# Patient Record
Sex: OTHER/UNKNOWN | URBAN - METROPOLITAN AREA
[De-identification: names, ages, dates, MRNs, and addresses within clinical notes are randomized per-mention and may not be internally consistent; named-entity substitution may affect disease eponyms.]

---

## 2021-11-08 ENCOUNTER — APPOINTMENT (OUTPATIENT)
Age: 66
Setting detail: DERMATOLOGY
End: 2021-11-08

## 2021-11-08 VITALS
HEIGHT: 69 IN | HEART RATE: 67 BPM | SYSTOLIC BLOOD PRESSURE: 142 MMHG | WEIGHT: 165 LBS | DIASTOLIC BLOOD PRESSURE: 79 MMHG

## 2021-11-08 DIAGNOSIS — L30.0 NUMMULAR DERMATITIS: ICD-10-CM

## 2021-11-08 PROCEDURE — OTHER MIPS QUALITY: OTHER

## 2021-11-08 PROCEDURE — OTHER PRESCRIPTION: OTHER

## 2021-11-08 PROCEDURE — OTHER COUNSELING: OTHER

## 2021-11-08 PROCEDURE — 99203 OFFICE O/P NEW LOW 30 MIN: CPT

## 2021-11-08 RX ORDER — CLOBETASOL PROPIONATE 0.5 MG/G
.05% CREAM TOPICAL BID
Qty: 45 | Refills: 1 | Status: ERX | COMMUNITY
Start: 2021-11-08

## 2021-11-08 NOTE — PROCEDURE: MIPS QUALITY
Name And Contact Information For Health Care Proxy: Dolly [phone] Name And Contact Information For Health Care Proxy: Dolly [phone]

## 2021-11-17 ENCOUNTER — RX ONLY (RX ONLY)
Age: 66
End: 2021-11-17

## 2021-11-17 RX ORDER — CLOBETASOL PROPIONATE 0.5 MG/G
.05% CREAM TOPICAL BID
Qty: 60 | Refills: 0 | Status: ERX

## 2021-11-29 ENCOUNTER — APPOINTMENT (OUTPATIENT)
Age: 66
Setting detail: DERMATOLOGY
End: 2021-11-29

## 2021-11-29 VITALS
HEIGHT: 70 IN | SYSTOLIC BLOOD PRESSURE: 170 MMHG | HEART RATE: 71 BPM | DIASTOLIC BLOOD PRESSURE: 92 MMHG | WEIGHT: 155 LBS

## 2021-11-29 DIAGNOSIS — R03.0 ELEVATED BLOOD-PRESSURE READING, WITHOUT DIAGNOSIS OF HYPERTENSION: ICD-10-CM

## 2021-11-29 DIAGNOSIS — L81.0 POSTINFLAMMATORY HYPERPIGMENTATION: ICD-10-CM

## 2021-11-29 DIAGNOSIS — L30.0 NUMMULAR DERMATITIS: ICD-10-CM

## 2021-11-29 PROCEDURE — OTHER TREATMENT REGIMEN: OTHER

## 2021-11-29 PROCEDURE — 99213 OFFICE O/P EST LOW 20 MIN: CPT

## 2021-11-29 PROCEDURE — OTHER MIPS QUALITY: OTHER

## 2021-11-29 PROCEDURE — OTHER COUNSELING: OTHER

## 2021-11-29 ASSESSMENT — LOCATION ZONE DERM: LOCATION ZONE: TRUNK

## 2021-11-29 ASSESSMENT — LOCATION SIMPLE DESCRIPTION DERM: LOCATION SIMPLE: RIGHT UPPER BACK

## 2021-11-29 ASSESSMENT — LOCATION DETAILED DESCRIPTION DERM: LOCATION DETAILED: RIGHT MID-UPPER BACK

## 2021-11-29 NOTE — PROCEDURE: MIPS QUALITY
Name And Contact Information For Health Care Proxy: Dolly [phone] Name And Contact Information For Health Care Proxy: Dolly [phone]

## 2021-11-29 NOTE — PROCEDURE: TREATMENT REGIMEN
Detail Level: Zone
Discontinue Regimen: clobetasol 0.05 % topical cream  only use it a week or two for a month then [place] it a break.